# Patient Record
Sex: FEMALE | Race: BLACK OR AFRICAN AMERICAN | NOT HISPANIC OR LATINO | Employment: FULL TIME | URBAN - METROPOLITAN AREA
[De-identification: names, ages, dates, MRNs, and addresses within clinical notes are randomized per-mention and may not be internally consistent; named-entity substitution may affect disease eponyms.]

---

## 2023-03-04 PROBLEM — N63.20 BREAST MASS, LEFT: Status: ACTIVE | Noted: 2023-03-04

## 2023-03-04 PROBLEM — E55.9 VITAMIN D DEFICIENCY: Status: ACTIVE | Noted: 2023-03-04

## 2023-03-04 PROBLEM — E11.9 DIABETES MELLITUS (MULTI): Status: ACTIVE | Noted: 2023-03-04

## 2023-03-04 PROBLEM — F41.9 ANXIETY: Status: ACTIVE | Noted: 2023-03-04

## 2023-03-04 PROBLEM — Z04.9 CONDITION NOT FOUND: Status: ACTIVE | Noted: 2023-03-04

## 2023-03-04 PROBLEM — N92.0 HEAVY MENSTRUAL BLEEDING: Status: ACTIVE | Noted: 2023-03-04

## 2023-03-04 PROBLEM — M51.36 LUMBAR DEGENERATIVE DISC DISEASE: Status: ACTIVE | Noted: 2023-03-04

## 2023-03-04 PROBLEM — N93.9 EPISODE OF HEAVY VAGINAL BLEEDING: Status: ACTIVE | Noted: 2023-03-04

## 2023-03-04 PROBLEM — N93.9 ABNORMAL UTERINE BLEEDING (AUB): Status: ACTIVE | Noted: 2023-03-04

## 2023-03-04 PROBLEM — D25.9 FIBROID UTERUS: Status: ACTIVE | Noted: 2023-03-04

## 2023-03-04 PROBLEM — M51.369 LUMBAR DEGENERATIVE DISC DISEASE: Status: ACTIVE | Noted: 2023-03-04

## 2023-03-04 PROBLEM — Z98.84 STATUS POST LAPAROSCOPIC SLEEVE GASTRECTOMY: Status: ACTIVE | Noted: 2023-03-04

## 2023-03-04 PROBLEM — R51.9 NEW ONSET HEADACHE: Status: ACTIVE | Noted: 2023-03-04

## 2023-03-04 PROBLEM — E66.01 MORBID OBESITY (MULTI): Status: ACTIVE | Noted: 2023-03-04

## 2023-03-04 PROBLEM — N94.6 DYSMENORRHEA: Status: ACTIVE | Noted: 2023-03-04

## 2023-03-04 RX ORDER — NORETHINDRONE 5 MG/1
1 TABLET ORAL 2 TIMES DAILY
COMMUNITY
Start: 2021-02-15 | End: 2023-03-20

## 2023-03-20 ENCOUNTER — OFFICE VISIT (OUTPATIENT)
Dept: PRIMARY CARE | Facility: CLINIC | Age: 48
End: 2023-03-20
Payer: COMMERCIAL

## 2023-03-20 VITALS
DIASTOLIC BLOOD PRESSURE: 86 MMHG | WEIGHT: 238 LBS | RESPIRATION RATE: 18 BRPM | HEIGHT: 65 IN | HEART RATE: 90 BPM | SYSTOLIC BLOOD PRESSURE: 144 MMHG | BODY MASS INDEX: 39.65 KG/M2 | TEMPERATURE: 98 F

## 2023-03-20 DIAGNOSIS — Z00.00 HEALTHCARE MAINTENANCE: ICD-10-CM

## 2023-03-20 DIAGNOSIS — E11.9 CONTROLLED TYPE 2 DIABETES MELLITUS WITHOUT COMPLICATION, WITHOUT LONG-TERM CURRENT USE OF INSULIN (MULTI): Primary | ICD-10-CM

## 2023-03-20 DIAGNOSIS — Z12.11 COLON CANCER SCREENING: ICD-10-CM

## 2023-03-20 PROCEDURE — 3079F DIAST BP 80-89 MM HG: CPT | Performed by: INTERNAL MEDICINE

## 2023-03-20 PROCEDURE — 83036 HEMOGLOBIN GLYCOSYLATED A1C: CPT

## 2023-03-20 PROCEDURE — 36415 COLL VENOUS BLD VENIPUNCTURE: CPT | Performed by: INTERNAL MEDICINE

## 2023-03-20 PROCEDURE — 1036F TOBACCO NON-USER: CPT | Performed by: INTERNAL MEDICINE

## 2023-03-20 PROCEDURE — 82465 ASSAY BLD/SERUM CHOLESTEROL: CPT

## 2023-03-20 PROCEDURE — 3077F SYST BP >= 140 MM HG: CPT | Performed by: INTERNAL MEDICINE

## 2023-03-20 PROCEDURE — 83718 ASSAY OF LIPOPROTEIN: CPT

## 2023-03-20 PROCEDURE — 80053 COMPREHEN METABOLIC PANEL: CPT

## 2023-03-20 PROCEDURE — 99202 OFFICE O/P NEW SF 15 MIN: CPT | Performed by: INTERNAL MEDICINE

## 2023-03-20 SDOH — ECONOMIC STABILITY: HOUSING INSECURITY
IN THE LAST 12 MONTHS, WAS THERE A TIME WHEN YOU DID NOT HAVE A STEADY PLACE TO SLEEP OR SLEPT IN A SHELTER (INCLUDING NOW)?: NO

## 2023-03-20 SDOH — ECONOMIC STABILITY: FOOD INSECURITY: WITHIN THE PAST 12 MONTHS, YOU WORRIED THAT YOUR FOOD WOULD RUN OUT BEFORE YOU GOT MONEY TO BUY MORE.: NEVER TRUE

## 2023-03-20 SDOH — ECONOMIC STABILITY: INCOME INSECURITY: IN THE LAST 12 MONTHS, WAS THERE A TIME WHEN YOU WERE NOT ABLE TO PAY THE MORTGAGE OR RENT ON TIME?: NO

## 2023-03-20 SDOH — ECONOMIC STABILITY: TRANSPORTATION INSECURITY
IN THE PAST 12 MONTHS, HAS THE LACK OF TRANSPORTATION KEPT YOU FROM MEDICAL APPOINTMENTS OR FROM GETTING MEDICATIONS?: NO

## 2023-03-20 SDOH — ECONOMIC STABILITY: FOOD INSECURITY: WITHIN THE PAST 12 MONTHS, THE FOOD YOU BOUGHT JUST DIDN'T LAST AND YOU DIDN'T HAVE MONEY TO GET MORE.: NEVER TRUE

## 2023-03-20 SDOH — ECONOMIC STABILITY: TRANSPORTATION INSECURITY
IN THE PAST 12 MONTHS, HAS LACK OF TRANSPORTATION KEPT YOU FROM MEETINGS, WORK, OR FROM GETTING THINGS NEEDED FOR DAILY LIVING?: NO

## 2023-03-20 SDOH — HEALTH STABILITY: PHYSICAL HEALTH: ON AVERAGE, HOW MANY DAYS PER WEEK DO YOU ENGAGE IN MODERATE TO STRENUOUS EXERCISE (LIKE A BRISK WALK)?: 2 DAYS

## 2023-03-20 SDOH — HEALTH STABILITY: PHYSICAL HEALTH: ON AVERAGE, HOW MANY MINUTES DO YOU ENGAGE IN EXERCISE AT THIS LEVEL?: 40 MIN

## 2023-03-20 ASSESSMENT — ANXIETY QUESTIONNAIRES
3. WORRYING TOO MUCH ABOUT DIFFERENT THINGS: NOT AT ALL
GAD7 TOTAL SCORE: 0
2. NOT BEING ABLE TO STOP OR CONTROL WORRYING: NOT AT ALL
6. BECOMING EASILY ANNOYED OR IRRITABLE: NOT AT ALL
4. TROUBLE RELAXING: NOT AT ALL
5. BEING SO RESTLESS THAT IT IS HARD TO SIT STILL: NOT AT ALL
1. FEELING NERVOUS, ANXIOUS, OR ON EDGE: NOT AT ALL
7. FEELING AFRAID AS IF SOMETHING AWFUL MIGHT HAPPEN: NOT AT ALL

## 2023-03-20 ASSESSMENT — ENCOUNTER SYMPTOMS
CONSTIPATION: 0
SHORTNESS OF BREATH: 0
OCCASIONAL FEELINGS OF UNSTEADINESS: 0
ACTIVITY CHANGE: 0
CHEST TIGHTNESS: 0
DEPRESSION: 0
AGITATION: 0
SORE THROAT: 0
CHILLS: 0
MYALGIAS: 0
SINUS PRESSURE: 0
LOSS OF SENSATION IN FEET: 0
WEAKNESS: 0
DIARRHEA: 0
DIZZINESS: 0
PALPITATIONS: 0
NAUSEA: 0
BACK PAIN: 1
HEADACHES: 0

## 2023-03-20 ASSESSMENT — PAIN SCALES - GENERAL: PAINLEVEL: 0-NO PAIN

## 2023-03-20 ASSESSMENT — SOCIAL DETERMINANTS OF HEALTH (SDOH)
HOW HARD IS IT FOR YOU TO PAY FOR THE VERY BASICS LIKE FOOD, HOUSING, MEDICAL CARE, AND HEATING?: NOT VERY HARD
HOW OFTEN DO YOU ATTEND CHURCH OR RELIGIOUS SERVICES?: MORE THAN 4 TIMES PER YEAR
WITHIN THE LAST YEAR, HAVE YOU BEEN HUMILIATED OR EMOTIONALLY ABUSED IN OTHER WAYS BY YOUR PARTNER OR EX-PARTNER?: NO
ARE YOU MARRIED, WIDOWED, DIVORCED, SEPARATED, NEVER MARRIED, OR LIVING WITH A PARTNER?: PATIENT DECLINED
WITHIN THE LAST YEAR, HAVE YOU BEEN KICKED, HIT, SLAPPED, OR OTHERWISE PHYSICALLY HURT BY YOUR PARTNER OR EX-PARTNER?: NO
HOW OFTEN DO YOU ATTENT MEETINGS OF THE CLUB OR ORGANIZATION YOU BELONG TO?: PATIENT DECLINED
IN A TYPICAL WEEK, HOW MANY TIMES DO YOU TALK ON THE PHONE WITH FAMILY, FRIENDS, OR NEIGHBORS?: TWICE A WEEK
WITHIN THE LAST YEAR, HAVE TO BEEN RAPED OR FORCED TO HAVE ANY KIND OF SEXUAL ACTIVITY BY YOUR PARTNER OR EX-PARTNER?: NO
HOW OFTEN DO YOU GET TOGETHER WITH FRIENDS OR RELATIVES?: TWICE A WEEK
DO YOU BELONG TO ANY CLUBS OR ORGANIZATIONS SUCH AS CHURCH GROUPS UNIONS, FRATERNAL OR ATHLETIC GROUPS, OR SCHOOL GROUPS?: PATIENT DECLINED
WITHIN THE LAST YEAR, HAVE YOU BEEN AFRAID OF YOUR PARTNER OR EX-PARTNER?: NO

## 2023-03-20 ASSESSMENT — PATIENT HEALTH QUESTIONNAIRE - PHQ9
2. FEELING DOWN, DEPRESSED OR HOPELESS: NOT AT ALL
1. LITTLE INTEREST OR PLEASURE IN DOING THINGS: NOT AT ALL
SUM OF ALL RESPONSES TO PHQ9 QUESTIONS 1 & 2: 0

## 2023-03-20 ASSESSMENT — COLUMBIA-SUICIDE SEVERITY RATING SCALE - C-SSRS
2. HAVE YOU ACTUALLY HAD ANY THOUGHTS OF KILLING YOURSELF?: NO
6. HAVE YOU EVER DONE ANYTHING, STARTED TO DO ANYTHING, OR PREPARED TO DO ANYTHING TO END YOUR LIFE?: NO
1. IN THE PAST MONTH, HAVE YOU WISHED YOU WERE DEAD OR WISHED YOU COULD GO TO SLEEP AND NOT WAKE UP?: NO

## 2023-03-20 ASSESSMENT — LIFESTYLE VARIABLES
HOW OFTEN DO YOU HAVE SIX OR MORE DRINKS ON ONE OCCASION: NEVER
HOW MANY STANDARD DRINKS CONTAINING ALCOHOL DO YOU HAVE ON A TYPICAL DAY: 1 OR 2

## 2023-03-20 NOTE — PROGRESS NOTES
"Subjective   Patient ID: Jeanie Carbajal is a 47 y.o. female who presents for New Patient Visit. Works in insurance claims adjustment.  Hasn't had a colonoscopy.  Diabetes runs in the family and she had DM2 before gastric sleeve surgery.  She was on metformin but didn't like it.  Denies polyuria or polydipsia.  Working to start exercising again.      HPI     Review of Systems   Constitutional:  Negative for activity change and chills.   HENT:  Negative for congestion, hearing loss, sinus pressure and sore throat.    Respiratory:  Negative for chest tightness and shortness of breath.    Cardiovascular:  Negative for chest pain and palpitations.   Gastrointestinal:  Negative for constipation, diarrhea and nausea.   Genitourinary:  Positive for pelvic pain.   Musculoskeletal:  Positive for back pain. Negative for gait problem and myalgias.   Neurological:  Negative for dizziness, weakness and headaches.   Psychiatric/Behavioral:  Negative for agitation and behavioral problems.        Objective   /86 (BP Location: Left arm, Patient Position: Sitting, BP Cuff Size: Large adult)   Pulse 90   Temp 36.7 °C (98 °F) (Temporal)   Resp 18   Ht 1.651 m (5' 5\")   Wt 108 kg (238 lb)   LMP 01/25/2021   BMI 39.61 kg/m²     Physical Exam  Constitutional:       Appearance: Normal appearance.   HENT:      Head: Normocephalic and atraumatic.      Nose: Nose normal.      Mouth/Throat:      Mouth: Mucous membranes are moist.   Eyes:      Extraocular Movements: Extraocular movements intact.      Pupils: Pupils are equal, round, and reactive to light.   Cardiovascular:      Rate and Rhythm: Normal rate and regular rhythm.   Pulmonary:      Effort: No respiratory distress.      Breath sounds: No wheezing.   Abdominal:      General: Bowel sounds are normal.      Palpations: Abdomen is soft.   Neurological:      General: No focal deficit present.         Assessment/Plan   Problem List Items Addressed This Visit          " Endocrine/Metabolic    Controlled type 2 diabetes mellitus without complication, without long-term current use of insulin (CMS/[facility]) - Primary    Relevant Orders    Hemoglobin A1C    Comprehensive Metabolic Panel    Lipid Panel Non-Fasting       Other    Healthcare maintenance    Relevant Orders    Comprehensive Metabolic Panel    Lipid Panel Non-Fasting    BI mammo bilateral diagnostic     Other Visit Diagnoses       Colon cancer screening        Relevant Orders    Colonoscopy

## 2023-03-21 LAB
ALANINE AMINOTRANSFERASE (SGPT) (U/L) IN SER/PLAS: 12 U/L (ref 7–45)
ALBUMIN (G/DL) IN SER/PLAS: 4.3 G/DL (ref 3.4–5)
ALKALINE PHOSPHATASE (U/L) IN SER/PLAS: 58 U/L (ref 33–110)
ANION GAP IN SER/PLAS: 14 MMOL/L (ref 10–20)
ASPARTATE AMINOTRANSFERASE (SGOT) (U/L) IN SER/PLAS: 12 U/L (ref 9–39)
BILIRUBIN TOTAL (MG/DL) IN SER/PLAS: 0.4 MG/DL (ref 0–1.2)
CALCIUM (MG/DL) IN SER/PLAS: 9.5 MG/DL (ref 8.6–10.6)
CARBON DIOXIDE, TOTAL (MMOL/L) IN SER/PLAS: 25 MMOL/L (ref 21–32)
CHLORIDE (MMOL/L) IN SER/PLAS: 104 MMOL/L (ref 98–107)
CHOLESTEROL (MG/DL) IN SER/PLAS: 268 MG/DL (ref 0–199)
CHOLESTEROL IN HDL (MG/DL) IN SER/PLAS: 73.7 MG/DL
CHOLESTEROL/HDL RATIO: 3.6
CREATININE (MG/DL) IN SER/PLAS: 0.88 MG/DL (ref 0.5–1.05)
ESTIMATED AVERAGE GLUCOSE FOR HBA1C: 148 MG/DL
GFR FEMALE: 81 ML/MIN/1.73M2
GLUCOSE (MG/DL) IN SER/PLAS: 113 MG/DL (ref 74–99)
HEMOGLOBIN A1C/HEMOGLOBIN TOTAL IN BLOOD: 6.8 %
NON-HDL CHOLESTEROL: 194 MG/DL
POTASSIUM (MMOL/L) IN SER/PLAS: 4.4 MMOL/L (ref 3.5–5.3)
PROTEIN TOTAL: 7.4 G/DL (ref 6.4–8.2)
SODIUM (MMOL/L) IN SER/PLAS: 139 MMOL/L (ref 136–145)
UREA NITROGEN (MG/DL) IN SER/PLAS: 14 MG/DL (ref 6–23)

## 2023-03-25 ENCOUNTER — TELEPHONE (OUTPATIENT)
Dept: PRIMARY CARE | Facility: CLINIC | Age: 48
End: 2023-03-25
Payer: COMMERCIAL

## 2023-03-25 DIAGNOSIS — E78.2 HYPERLIPIDEMIA, MIXED: ICD-10-CM

## 2023-03-25 DIAGNOSIS — Z12.39 BREAST SCREENING: ICD-10-CM

## 2023-03-25 DIAGNOSIS — E11.9 CONTROLLED TYPE 2 DIABETES MELLITUS WITHOUT COMPLICATION, WITHOUT LONG-TERM CURRENT USE OF INSULIN (MULTI): Primary | ICD-10-CM

## 2023-03-25 RX ORDER — METFORMIN HYDROCHLORIDE 500 MG/1
500 TABLET ORAL
Qty: 180 TABLET | Refills: 3 | Status: SHIPPED | OUTPATIENT
Start: 2023-03-25 | End: 2024-03-20

## 2023-03-25 RX ORDER — ATORVASTATIN CALCIUM 20 MG/1
20 TABLET, FILM COATED ORAL DAILY
Qty: 90 TABLET | Refills: 3 | Status: SHIPPED | OUTPATIENT
Start: 2023-03-25 | End: 2024-03-20

## 2023-03-25 NOTE — TELEPHONE ENCOUNTER
The patient's laboratory results were reviewed over the phone.  All questions answered.   She agrees to start medicines to address both her elevated blood sugar and her cholesterol.  She will return in 3 months for redraw those tests.

## 2024-03-19 DIAGNOSIS — E78.2 HYPERLIPIDEMIA, MIXED: ICD-10-CM

## 2024-03-19 DIAGNOSIS — E11.9 CONTROLLED TYPE 2 DIABETES MELLITUS WITHOUT COMPLICATION, WITHOUT LONG-TERM CURRENT USE OF INSULIN (MULTI): ICD-10-CM

## 2024-03-20 RX ORDER — METFORMIN HYDROCHLORIDE 500 MG/1
500 TABLET ORAL
Qty: 180 TABLET | Refills: 3 | Status: SHIPPED | OUTPATIENT
Start: 2024-03-20

## 2024-03-20 RX ORDER — ATORVASTATIN CALCIUM 20 MG/1
20 TABLET, FILM COATED ORAL DAILY
Qty: 90 TABLET | Refills: 3 | Status: SHIPPED | OUTPATIENT
Start: 2024-03-20

## 2024-04-29 ENCOUNTER — OFFICE VISIT (OUTPATIENT)
Dept: PRIMARY CARE | Facility: HOSPITAL | Age: 49
End: 2024-04-29
Payer: COMMERCIAL

## 2024-04-29 ENCOUNTER — LAB (OUTPATIENT)
Dept: LAB | Facility: LAB | Age: 49
End: 2024-04-29
Payer: COMMERCIAL

## 2024-04-29 VITALS
SYSTOLIC BLOOD PRESSURE: 124 MMHG | WEIGHT: 220.4 LBS | BODY MASS INDEX: 36.72 KG/M2 | RESPIRATION RATE: 18 BRPM | OXYGEN SATURATION: 99 % | DIASTOLIC BLOOD PRESSURE: 78 MMHG | HEART RATE: 75 BPM | TEMPERATURE: 98.1 F | HEIGHT: 65 IN

## 2024-04-29 DIAGNOSIS — E78.2 HYPERLIPIDEMIA, MIXED: ICD-10-CM

## 2024-04-29 DIAGNOSIS — Z11.59 NEED FOR HEPATITIS C SCREENING TEST: ICD-10-CM

## 2024-04-29 DIAGNOSIS — Z11.4 SCREENING FOR HIV (HUMAN IMMUNODEFICIENCY VIRUS): ICD-10-CM

## 2024-04-29 DIAGNOSIS — E11.9 CONTROLLED TYPE 2 DIABETES MELLITUS WITHOUT COMPLICATION, WITHOUT LONG-TERM CURRENT USE OF INSULIN (MULTI): Primary | ICD-10-CM

## 2024-04-29 DIAGNOSIS — E55.9 HYPOVITAMINOSIS D: ICD-10-CM

## 2024-04-29 DIAGNOSIS — Z12.11 COLON CANCER SCREENING: ICD-10-CM

## 2024-04-29 DIAGNOSIS — E11.9 CONTROLLED TYPE 2 DIABETES MELLITUS WITHOUT COMPLICATION, WITHOUT LONG-TERM CURRENT USE OF INSULIN (MULTI): ICD-10-CM

## 2024-04-29 DIAGNOSIS — Z12.39 BREAST SCREENING: ICD-10-CM

## 2024-04-29 LAB
25(OH)D3 SERPL-MCNC: 49 NG/ML (ref 30–100)
ALBUMIN SERPL BCP-MCNC: 4.5 G/DL (ref 3.4–5)
ALP SERPL-CCNC: 58 U/L (ref 33–110)
ALT SERPL W P-5'-P-CCNC: 15 U/L (ref 7–45)
ANION GAP SERPL CALC-SCNC: 13 MMOL/L (ref 10–20)
AST SERPL W P-5'-P-CCNC: 16 U/L (ref 9–39)
BILIRUB SERPL-MCNC: 0.3 MG/DL (ref 0–1.2)
BUN SERPL-MCNC: 16 MG/DL (ref 6–23)
CALCIUM SERPL-MCNC: 9.2 MG/DL (ref 8.6–10.3)
CHLORIDE SERPL-SCNC: 102 MMOL/L (ref 98–107)
CHOLEST SERPL-MCNC: 184 MG/DL (ref 0–199)
CHOLESTEROL/HDL RATIO: 2.3
CO2 SERPL-SCNC: 27 MMOL/L (ref 21–32)
CREAT SERPL-MCNC: 0.89 MG/DL (ref 0.5–1.05)
EGFRCR SERPLBLD CKD-EPI 2021: 80 ML/MIN/1.73M*2
EST. AVERAGE GLUCOSE BLD GHB EST-MCNC: 117 MG/DL
GLUCOSE SERPL-MCNC: 84 MG/DL (ref 74–99)
HBA1C MFR BLD: 5.7 %
HCV AB SER QL: NONREACTIVE
HDLC SERPL-MCNC: 79.6 MG/DL
NON-HDL CHOLESTEROL: 104 MG/DL (ref 0–149)
POTASSIUM SERPL-SCNC: 4.1 MMOL/L (ref 3.5–5.3)
PROT SERPL-MCNC: 7.3 G/DL (ref 6.4–8.2)
SODIUM SERPL-SCNC: 138 MMOL/L (ref 136–145)

## 2024-04-29 PROCEDURE — 80053 COMPREHEN METABOLIC PANEL: CPT

## 2024-04-29 PROCEDURE — 82306 VITAMIN D 25 HYDROXY: CPT

## 2024-04-29 PROCEDURE — 99212 OFFICE O/P EST SF 10 MIN: CPT | Performed by: INTERNAL MEDICINE

## 2024-04-29 PROCEDURE — 87389 HIV-1 AG W/HIV-1&-2 AB AG IA: CPT

## 2024-04-29 PROCEDURE — 3074F SYST BP LT 130 MM HG: CPT | Performed by: INTERNAL MEDICINE

## 2024-04-29 PROCEDURE — 86803 HEPATITIS C AB TEST: CPT

## 2024-04-29 PROCEDURE — 99396 PREV VISIT EST AGE 40-64: CPT | Performed by: INTERNAL MEDICINE

## 2024-04-29 PROCEDURE — 99212 OFFICE O/P EST SF 10 MIN: CPT | Mod: 25 | Performed by: INTERNAL MEDICINE

## 2024-04-29 PROCEDURE — 83036 HEMOGLOBIN GLYCOSYLATED A1C: CPT

## 2024-04-29 PROCEDURE — 82043 UR ALBUMIN QUANTITATIVE: CPT

## 2024-04-29 PROCEDURE — 82465 ASSAY BLD/SERUM CHOLESTEROL: CPT

## 2024-04-29 PROCEDURE — 3078F DIAST BP <80 MM HG: CPT | Performed by: INTERNAL MEDICINE

## 2024-04-29 PROCEDURE — 1036F TOBACCO NON-USER: CPT | Performed by: INTERNAL MEDICINE

## 2024-04-29 PROCEDURE — 4010F ACE/ARB THERAPY RXD/TAKEN: CPT | Performed by: INTERNAL MEDICINE

## 2024-04-29 PROCEDURE — 36415 COLL VENOUS BLD VENIPUNCTURE: CPT

## 2024-04-29 PROCEDURE — 83718 ASSAY OF LIPOPROTEIN: CPT

## 2024-04-29 PROCEDURE — 82570 ASSAY OF URINE CREATININE: CPT

## 2024-04-29 RX ORDER — LOSARTAN POTASSIUM 25 MG/1
25 TABLET ORAL DAILY
Qty: 100 TABLET | Refills: 3 | Status: SHIPPED | OUTPATIENT
Start: 2024-04-29 | End: 2025-06-03

## 2024-04-29 ASSESSMENT — ANXIETY QUESTIONNAIRES
1. FEELING NERVOUS, ANXIOUS, OR ON EDGE: NOT AT ALL
4. TROUBLE RELAXING: NOT AT ALL
2. NOT BEING ABLE TO STOP OR CONTROL WORRYING: NOT AT ALL
7. FEELING AFRAID AS IF SOMETHING AWFUL MIGHT HAPPEN: NOT AT ALL
3. WORRYING TOO MUCH ABOUT DIFFERENT THINGS: NOT AT ALL
5. BEING SO RESTLESS THAT IT IS HARD TO SIT STILL: NOT AT ALL
GAD7 TOTAL SCORE: 0
6. BECOMING EASILY ANNOYED OR IRRITABLE: NOT AT ALL
IF YOU CHECKED OFF ANY PROBLEMS ON THIS QUESTIONNAIRE, HOW DIFFICULT HAVE THESE PROBLEMS MADE IT FOR YOU TO DO YOUR WORK, TAKE CARE OF THINGS AT HOME, OR GET ALONG WITH OTHER PEOPLE: NOT DIFFICULT AT ALL

## 2024-04-29 ASSESSMENT — ENCOUNTER SYMPTOMS
PALPITATIONS: 0
WEAKNESS: 0
ACTIVITY CHANGE: 0
SORE THROAT: 0
AGITATION: 0
DIARRHEA: 0
MYALGIAS: 0
LOSS OF SENSATION IN FEET: 0
CHEST TIGHTNESS: 0
SINUS PRESSURE: 0
DEPRESSION: 0
SHORTNESS OF BREATH: 0
CHILLS: 0
NAUSEA: 0
OCCASIONAL FEELINGS OF UNSTEADINESS: 0

## 2024-04-29 ASSESSMENT — PATIENT HEALTH QUESTIONNAIRE - PHQ9
2. FEELING DOWN, DEPRESSED OR HOPELESS: NOT AT ALL
1. LITTLE INTEREST OR PLEASURE IN DOING THINGS: NOT AT ALL
SUM OF ALL RESPONSES TO PHQ9 QUESTIONS 1 AND 2: 0

## 2024-04-29 ASSESSMENT — COLUMBIA-SUICIDE SEVERITY RATING SCALE - C-SSRS
6. HAVE YOU EVER DONE ANYTHING, STARTED TO DO ANYTHING, OR PREPARED TO DO ANYTHING TO END YOUR LIFE?: NO
1. IN THE PAST MONTH, HAVE YOU WISHED YOU WERE DEAD OR WISHED YOU COULD GO TO SLEEP AND NOT WAKE UP?: NO
2. HAVE YOU ACTUALLY HAD ANY THOUGHTS OF KILLING YOURSELF?: NO

## 2024-04-29 ASSESSMENT — PAIN SCALES - GENERAL: PAINLEVEL: 0-NO PAIN

## 2024-04-29 NOTE — PROGRESS NOTES
"Subjective   Patient ID: Jeanie Carbajal is a 49 y.o. female who presents for Annual Exam.  She is doing well overall and is taking her medication as prescribed.  She felt that her sugars were a little elevated and started taking the metformin 2 times daily, which was how it was prescribed, but she in the past had only been taking it once daily.  She takes the losartan and atorvastatin as prescribed.  She has not had any hypoglycemic episodes.    HPI     Review of Systems   Constitutional:  Negative for activity change and chills.   HENT:  Negative for congestion, sinus pressure and sore throat.    Respiratory:  Negative for chest tightness and shortness of breath.    Cardiovascular:  Negative for chest pain and palpitations.   Gastrointestinal:  Negative for diarrhea and nausea.   Musculoskeletal:  Negative for myalgias.   Neurological:  Negative for weakness.   Psychiatric/Behavioral:  Negative for agitation and behavioral problems.        Objective   /78   Pulse 75   Temp 36.7 °C (98.1 °F)   Resp 18   Ht 1.651 m (5' 5\")   Wt 100 kg (220 lb 6.4 oz)   LMP 01/25/2021   SpO2 99%   BMI 36.68 kg/m²     Physical Exam  Constitutional:       Appearance: Normal appearance.   HENT:      Head: Normocephalic and atraumatic.      Nose: Nose normal.      Mouth/Throat:      Mouth: Mucous membranes are moist.   Eyes:      Extraocular Movements: Extraocular movements intact.      Pupils: Pupils are equal, round, and reactive to light.   Cardiovascular:      Rate and Rhythm: Normal rate and regular rhythm.   Pulmonary:      Effort: No respiratory distress.      Breath sounds: No wheezing.   Abdominal:      General: Bowel sounds are normal.      Palpations: Abdomen is soft.   Neurological:      General: No focal deficit present.       Assessment/Plan   Problem List Items Addressed This Visit             ICD-10-CM    Controlled type 2 diabetes mellitus without complication, without long-term current use of insulin " (Multi) - Primary E11.9    Relevant Medications    losartan (Cozaar) 25 mg tablet    Other Relevant Orders    Hemoglobin A1C    Comprehensive Metabolic Panel    Albumin , Urine Random     Other Visit Diagnoses         Codes    Hyperlipidemia, mixed     E78.2    Relevant Orders    Comprehensive Metabolic Panel    Lipid Panel Non-Fasting    Hypovitaminosis D     E55.9    Relevant Orders    Vitamin D 25-Hydroxy,Total (for eval of Vitamin D levels)    Colon cancer screening     Z12.11    Relevant Orders    Colonoscopy Screening; Average Risk Patient    Breast screening     Z12.39    Relevant Orders    BI mammo bilateral screening tomosynthesis    Need for hepatitis C screening test     Z11.59    Relevant Orders    Hepatitis C Antibody    Screening for HIV (human immunodeficiency virus)     Z11.4    Relevant Orders    HIV 1/2 Antigen/Antibody Screen with Reflex to Confirmation        She will continue the medication as prescribed and continue to follow her blood sugars.  We will fill care gaps as indicated above.

## 2024-04-30 LAB
CREAT UR-MCNC: 127.7 MG/DL (ref 20–320)
HIV 1+2 AB+HIV1 P24 AG SERPL QL IA: NONREACTIVE
MICROALBUMIN UR-MCNC: <7 MG/L
MICROALBUMIN/CREAT UR: NORMAL MG/G{CREAT}

## 2024-07-17 ENCOUNTER — HOSPITAL ENCOUNTER (OUTPATIENT)
Dept: GASTROENTEROLOGY | Facility: HOSPITAL | Age: 49
Discharge: HOME | End: 2024-07-17
Payer: COMMERCIAL

## 2024-07-17 VITALS
DIASTOLIC BLOOD PRESSURE: 81 MMHG | RESPIRATION RATE: 16 BRPM | OXYGEN SATURATION: 98 % | BODY MASS INDEX: 36.65 KG/M2 | HEART RATE: 70 BPM | TEMPERATURE: 97.5 F | SYSTOLIC BLOOD PRESSURE: 119 MMHG | HEIGHT: 65 IN | WEIGHT: 220 LBS

## 2024-07-17 DIAGNOSIS — Z12.11 COLON CANCER SCREENING: ICD-10-CM

## 2024-07-17 LAB — GLUCOSE BLD MANUAL STRIP-MCNC: 98 MG/DL (ref 74–99)

## 2024-07-17 PROCEDURE — 3700000012 HC SEDATION LEVEL 5+ TIME - INITIAL 15 MINUTES 5/> YEARS

## 2024-07-17 PROCEDURE — 82947 ASSAY GLUCOSE BLOOD QUANT: CPT

## 2024-07-17 PROCEDURE — 2500000004 HC RX 250 GENERAL PHARMACY W/ HCPCS (ALT 636 FOR OP/ED): Performed by: STUDENT IN AN ORGANIZED HEALTH CARE EDUCATION/TRAINING PROGRAM

## 2024-07-17 PROCEDURE — 7100000010 HC PHASE TWO TIME - EACH INCREMENTAL 1 MINUTE

## 2024-07-17 PROCEDURE — 45380 COLONOSCOPY AND BIOPSY: CPT | Performed by: STUDENT IN AN ORGANIZED HEALTH CARE EDUCATION/TRAINING PROGRAM

## 2024-07-17 PROCEDURE — 2720000007 HC OR 272 NO HCPCS

## 2024-07-17 PROCEDURE — 99152 MOD SED SAME PHYS/QHP 5/>YRS: CPT | Performed by: STUDENT IN AN ORGANIZED HEALTH CARE EDUCATION/TRAINING PROGRAM

## 2024-07-17 PROCEDURE — 99153 MOD SED SAME PHYS/QHP EA: CPT | Performed by: STUDENT IN AN ORGANIZED HEALTH CARE EDUCATION/TRAINING PROGRAM

## 2024-07-17 PROCEDURE — 3700000013 HC SEDATION LEVEL 5+ TIME - EACH ADDITIONAL 15 MINUTES

## 2024-07-17 PROCEDURE — 7100000009 HC PHASE TWO TIME - INITIAL BASE CHARGE

## 2024-07-17 PROCEDURE — 45385 COLONOSCOPY W/LESION REMOVAL: CPT | Performed by: STUDENT IN AN ORGANIZED HEALTH CARE EDUCATION/TRAINING PROGRAM

## 2024-07-17 RX ORDER — MIDAZOLAM HYDROCHLORIDE 1 MG/ML
INJECTION, SOLUTION INTRAMUSCULAR; INTRAVENOUS AS NEEDED
Status: COMPLETED | OUTPATIENT
Start: 2024-07-17 | End: 2024-07-17

## 2024-07-17 RX ORDER — SODIUM CHLORIDE, SODIUM LACTATE, POTASSIUM CHLORIDE, CALCIUM CHLORIDE 600; 310; 30; 20 MG/100ML; MG/100ML; MG/100ML; MG/100ML
20 INJECTION, SOLUTION INTRAVENOUS CONTINUOUS
Status: DISCONTINUED | OUTPATIENT
Start: 2024-07-17 | End: 2024-07-18 | Stop reason: HOSPADM

## 2024-07-17 RX ORDER — MIDAZOLAM HYDROCHLORIDE 5 MG/ML
INJECTION, SOLUTION INTRAMUSCULAR; INTRAVENOUS AS NEEDED
Status: COMPLETED | OUTPATIENT
Start: 2024-07-17 | End: 2024-07-17

## 2024-07-17 RX ORDER — FENTANYL CITRATE 50 UG/ML
INJECTION, SOLUTION INTRAMUSCULAR; INTRAVENOUS AS NEEDED
Status: COMPLETED | OUTPATIENT
Start: 2024-07-17 | End: 2024-07-17

## 2024-07-17 RX ORDER — ONDANSETRON HYDROCHLORIDE 2 MG/ML
INJECTION, SOLUTION INTRAVENOUS AS NEEDED
Status: COMPLETED | OUTPATIENT
Start: 2024-07-17 | End: 2024-07-17

## 2024-07-17 ASSESSMENT — ENCOUNTER SYMPTOMS
DIARRHEA: 0
SLEEP DISTURBANCE: 0
COUGH: 0
SHORTNESS OF BREATH: 0
WHEEZING: 0
VOMITING: 0
COLOR CHANGE: 0
TROUBLE SWALLOWING: 0
NAUSEA: 0
LIGHT-HEADEDNESS: 0
HEADACHES: 0
CONFUSION: 0
FEVER: 0
ABDOMINAL DISTENTION: 0
JOINT SWELLING: 0
ABDOMINAL PAIN: 0
CHILLS: 0
DIZZINESS: 0
DIFFICULTY URINATING: 0
UNEXPECTED WEIGHT CHANGE: 0
CONSTIPATION: 0
ARTHRALGIAS: 0
SPEECH DIFFICULTY: 0

## 2024-07-17 ASSESSMENT — PAIN - FUNCTIONAL ASSESSMENT

## 2024-07-17 ASSESSMENT — PAIN SCALES - GENERAL
PAINLEVEL_OUTOF10: 0 - NO PAIN
PAINLEVEL_OUTOF10: 2
PAINLEVEL_OUTOF10: 0 - NO PAIN

## 2024-07-17 ASSESSMENT — COLUMBIA-SUICIDE SEVERITY RATING SCALE - C-SSRS
2. HAVE YOU ACTUALLY HAD ANY THOUGHTS OF KILLING YOURSELF?: NO
1. IN THE PAST MONTH, HAVE YOU WISHED YOU WERE DEAD OR WISHED YOU COULD GO TO SLEEP AND NOT WAKE UP?: NO
6. HAVE YOU EVER DONE ANYTHING, STARTED TO DO ANYTHING, OR PREPARED TO DO ANYTHING TO END YOUR LIFE?: NO

## 2024-07-17 NOTE — H&P
History Of Present Illness  Jeanie Carbajal is a 49 y.o. female presenting with screening colonoscopy.      Past Medical History  Past Medical History:   Diagnosis Date    Allergy status to unspecified drugs, medicaments and biological substances     History of seasonal allergies    Diabetes (Multi)     Encounter for full-term uncomplicated delivery ([facility])      (spontaneous vaginal delivery)    Hyperlipidemia     Personal history of gestational diabetes     History of gestational diabetes    Varicella without complication     Varicella without complication     Surgical History  Past Surgical History:   Procedure Laterality Date    HYSTERECTOMY      OTHER SURGICAL HISTORY  2014    Dental Surgery    OTHER SURGICAL HISTORY  2018    Gastrectomy Sleeve Laparoscopic     Social History  She reports that she has never smoked. She has never used smokeless tobacco. She reports current alcohol use of about 4.0 standard drinks of alcohol per week. She reports that she does not use drugs.    Family History  Family History   Problem Relation Name Age of Onset    Other (cardiac disorder) Mother      Diabetes Mother      Hypertension Mother      Other (brain tumor) Sister      Mitral valve prolapse Sister      Cancer Cousin      Breast cancer Maternal Cousin          Allergies  Allergies   Allergen Reactions    Penicillins Hives, Rash and Other     Vomiting      Review of Systems   Constitutional:  Negative for chills, fever and unexpected weight change.   HENT:  Negative for congestion and trouble swallowing.    Respiratory:  Negative for cough, shortness of breath and wheezing.    Cardiovascular:  Negative for chest pain.   Gastrointestinal:  Negative for abdominal distention, abdominal pain, constipation, diarrhea, nausea and vomiting.   Genitourinary:  Negative for difficulty urinating.   Musculoskeletal:  Negative for arthralgias and joint swelling.   Skin:  Negative for color change.   Neurological:   "Negative for dizziness, speech difficulty, light-headedness and headaches.   Psychiatric/Behavioral:  Negative for confusion and sleep disturbance.      Pre-sedation Evaluation:  ASA Classification - ASA 2 - Patient with mild systemic disease with no functional limitations  Mallampati Score - IV (only hard palate visible)    Physical Exam     Last Recorded Vitals  Blood pressure 122/83, pulse 75, temperature 36.4 °C (97.5 °F), resp. rate 12, height 1.651 m (5' 5\"), weight 99.8 kg (220 lb), last menstrual period 01/25/2021, SpO2 98%.     Assessment/Plan    screening colonoscopy.         PTA/Current Medications:  (Not in a hospital admission)    Current Outpatient Medications   Medication Sig Dispense Refill    atorvastatin (Lipitor) 20 mg tablet TAKE 1 TABLET BY MOUTH EVERY DAY 90 tablet 3    metFORMIN (Glucophage) 500 mg tablet TAKE 1 TABLET (500 MG) BY MOUTH IN THE MORNING AND IN THE EVENING WITH MEALS 180 tablet 3    losartan (Cozaar) 25 mg tablet Take 1 tablet (25 mg) by mouth once daily. (Patient not taking: Reported on 7/17/2024) 100 tablet 3     Current Facility-Administered Medications   Medication Dose Route Frequency Provider Last Rate Last Admin    lactated Ringer's infusion  20 mL/hr intravenous Continuous MD Anju Pritchett MD  "

## 2024-07-30 LAB
LABORATORY COMMENT REPORT: NORMAL
PATH REPORT.FINAL DX SPEC: NORMAL
PATH REPORT.GROSS SPEC: NORMAL
PATH REPORT.TOTAL CANCER: NORMAL

## 2024-10-29 ENCOUNTER — APPOINTMENT (OUTPATIENT)
Dept: PRIMARY CARE | Facility: CLINIC | Age: 49
End: 2024-10-29
Payer: COMMERCIAL

## 2024-10-29 VITALS
OXYGEN SATURATION: 94 % | BODY MASS INDEX: 37.38 KG/M2 | DIASTOLIC BLOOD PRESSURE: 79 MMHG | WEIGHT: 224.6 LBS | HEART RATE: 76 BPM | SYSTOLIC BLOOD PRESSURE: 120 MMHG

## 2024-10-29 DIAGNOSIS — E11.69 TYPE 2 DIABETES MELLITUS WITH OBESITY (MULTI): Primary | ICD-10-CM

## 2024-10-29 DIAGNOSIS — E66.9 TYPE 2 DIABETES MELLITUS WITH OBESITY (MULTI): Primary | ICD-10-CM

## 2024-10-29 PROCEDURE — 3078F DIAST BP <80 MM HG: CPT | Performed by: INTERNAL MEDICINE

## 2024-10-29 PROCEDURE — 99214 OFFICE O/P EST MOD 30 MIN: CPT | Performed by: INTERNAL MEDICINE

## 2024-10-29 PROCEDURE — 1036F TOBACCO NON-USER: CPT | Performed by: INTERNAL MEDICINE

## 2024-10-29 PROCEDURE — 3044F HG A1C LEVEL LT 7.0%: CPT | Performed by: INTERNAL MEDICINE

## 2024-10-29 PROCEDURE — 3062F POS MACROALBUMINURIA REV: CPT | Performed by: INTERNAL MEDICINE

## 2024-10-29 PROCEDURE — 4010F ACE/ARB THERAPY RXD/TAKEN: CPT | Performed by: INTERNAL MEDICINE

## 2024-10-29 PROCEDURE — 3074F SYST BP LT 130 MM HG: CPT | Performed by: INTERNAL MEDICINE

## 2024-10-29 ASSESSMENT — ENCOUNTER SYMPTOMS
MYALGIAS: 0
DIARRHEA: 0
AGITATION: 0
ACTIVITY CHANGE: 0
CHEST TIGHTNESS: 0
CHILLS: 0
PALPITATIONS: 0
SHORTNESS OF BREATH: 0
NAUSEA: 0
WEAKNESS: 0

## 2025-03-12 DIAGNOSIS — E11.9 CONTROLLED TYPE 2 DIABETES MELLITUS WITHOUT COMPLICATION, WITHOUT LONG-TERM CURRENT USE OF INSULIN (MULTI): ICD-10-CM

## 2025-03-12 DIAGNOSIS — E78.2 HYPERLIPIDEMIA, MIXED: ICD-10-CM

## 2025-03-13 RX ORDER — ATORVASTATIN CALCIUM 20 MG/1
20 TABLET, FILM COATED ORAL DAILY
Qty: 90 TABLET | Refills: 2 | Status: SHIPPED | OUTPATIENT
Start: 2025-03-13

## 2025-03-13 RX ORDER — METFORMIN HYDROCHLORIDE 500 MG/1
500 TABLET ORAL
Qty: 180 TABLET | Refills: 2 | Status: SHIPPED | OUTPATIENT
Start: 2025-03-13

## 2025-05-22 ENCOUNTER — APPOINTMENT (OUTPATIENT)
Dept: PRIMARY CARE | Facility: CLINIC | Age: 50
End: 2025-05-22
Payer: COMMERCIAL

## 2025-05-29 ENCOUNTER — APPOINTMENT (OUTPATIENT)
Dept: PRIMARY CARE | Facility: CLINIC | Age: 50
End: 2025-05-29
Payer: COMMERCIAL